# Patient Record
Sex: MALE
[De-identification: names, ages, dates, MRNs, and addresses within clinical notes are randomized per-mention and may not be internally consistent; named-entity substitution may affect disease eponyms.]

---

## 2020-12-28 ENCOUNTER — HOSPITAL ENCOUNTER (OUTPATIENT)
Dept: HOSPITAL 92 - BICULT | Age: 45
Discharge: HOME | End: 2020-12-28
Attending: NURSE PRACTITIONER
Payer: COMMERCIAL

## 2020-12-28 DIAGNOSIS — M79.661: Primary | ICD-10-CM

## 2020-12-28 NOTE — ULT
Right lower extremity venous Doppler ultrasound

12/28/2020



COMPARISON: None



HISTORY: Right leg pain, assess for DVT



TECHNIQUE: Multiplanar grayscale sonographic imaging venous structures right lower extremity obtained
 with color flow and spectral analysis



FINDINGS: Right common femoral vein, greater saphenous vein, profunda femoral vein, femoral vein, pop
liteal vein, and posterior tibial vein are patent. Normal blood flow, augmentation, and compression

within the deep venous system. No evidence for DVT.



IMPRESSION: No evidence for deep venous thrombosis of the right lower extremity.



Reported By: Malik Humphreys 

Electronically Signed:  12/28/2020 12:19 PM